# Patient Record
Sex: MALE | Race: WHITE
[De-identification: names, ages, dates, MRNs, and addresses within clinical notes are randomized per-mention and may not be internally consistent; named-entity substitution may affect disease eponyms.]

---

## 2018-01-10 ENCOUNTER — HOSPITAL ENCOUNTER (EMERGENCY)
Dept: HOSPITAL 25 - UCCORT | Age: 48
Discharge: HOME | End: 2018-01-10
Payer: COMMERCIAL

## 2018-01-10 VITALS — DIASTOLIC BLOOD PRESSURE: 92 MMHG | SYSTOLIC BLOOD PRESSURE: 139 MMHG
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
## 2018-01-10 DIAGNOSIS — I10: ICD-10-CM

## 2018-01-10 DIAGNOSIS — S61.217A: Primary | ICD-10-CM
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
## 2018-01-10 DIAGNOSIS — S60.052A: ICD-10-CM
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
## 2018-01-10 PROCEDURE — G0463 HOSPITAL OUTPT CLINIC VISIT: HCPCS

## 2018-01-10 PROCEDURE — 12001 RPR S/N/AX/GEN/TRNK 2.5CM/<: CPT

## 2018-01-10 PROCEDURE — 73140 X-RAY EXAM OF FINGER(S): CPT

## 2018-01-10 PROCEDURE — 99211 OFF/OP EST MAY X REQ PHY/QHP: CPT

## 2018-01-10 NOTE — RAD
INDICATION:  Crush injury, open wound left fifth finger.



TECHNIQUE: 3 views of the left fifth finger were obtained.



FINDINGS: There is soft tissue swelling present. The bones are in normal alignment. No

fracture is seen.  



IMPRESSION:  SOFT TISSUE SWELLING, NO FRACTURE IS SEEN.

## 2018-01-10 NOTE — UC
Laceration HPI





- HPI Summary


HPI Summary: 





pt c/o of laceration to left 5th finger on distal finger, "pad" o f finger.  Pt 

reports that his finger was crushed between a metal box and wall just prior ot 

arrival.  pt states he is UTD with tetanus. 





- History Of Current Complaint


Chief Complaint: UCTrauma


Stated Complaint: LEFT PINKY LACERATION WC


Time Seen by Provider: 01/10/18 12:22


Hx Obtained From: Patient


Laceration Location: Finger - left 5th


Mechanism Of Injury: Blunt Trauma


Onset/Duration: Sudden Onset


Severity: Mild


Aggravating Factors: Position, Movement





- Allergies/Home Medications


Allergies/Adverse Reactions: 


 Allergies











Allergy/AdvReac Type Severity Reaction Status Date / Time


 


hay fever Allergy  Wheezing Uncoded 01/10/18 12:22











Home Medications: 


 Home Medications





Rivaroxaban TAB(*) [Xarelto 20 mg]  01/10/18 [History]











PMH/Surg Hx/FS Hx/Imm Hx


Previously Healthy: Yes





- Surgical History


Surgical History: Yes


Surgery Procedure, Year, and Place: right shoulder surgery 2008.  right knee 

surgery 1985





- Family History


Known Family History: Positive: Hypertension





- Social History


Occupation: Employed Full-time


Lives: With Family


Alcohol Use: Occasionally


Substance Use Type: None


Smoking Status (MU): Never Smoked Tobacco


Have You Smoked in the Last Year: No





- Immunization History


Most Recent Influenza Vaccination: not this season


Most Recent Tetanus Shot: within 5 years





Review of Systems


Constitutional: Negative


Skin: Other - laceration left 5ht finger


Eyes: Negative


ENT: Negative


Respiratory: Negative


Cardiovascular: Negative


Gastrointestinal: Negative


Genitourinary: Negative


Motor: Negative


Neurovascular: Negative


Musculoskeletal: Arthralgia - left 5th finger distal,


Neurological: Negative


Psychological: Negative


Is Patient Immunocompromised?: No


All Other Systems Reviewed And Are Negative: Yes





Physical Exam


Triage Information Reviewed: Yes


Appearance: Well-Appearing


Vital Signs: 


 Initial Vital Signs











Temp  98.4 F   01/10/18 12:18


 


Pulse  78   01/10/18 12:18


 


Resp  18   01/10/18 12:18


 


BP  139/92   01/10/18 12:18


 


Pulse Ox  100   01/10/18 12:18











Vital Signs Reviewed: Yes


Eye Exam: Normal


ENT Exam: Normal


Dental Exam: Other - hallitosis


Respiratory: Positive: No respiratory distress


Musculoskeletal Exam: Normal


Musculoskeletal: Positive: Strength Intact, ROM Intact, No Edema


Neurological Exam: Normal


Psychological Exam: Normal


Skin Exam: Other - laceration to left distal palmar aspect aspect of 5th finger.





Laceration Repair





- Laceration Repair


  ** 1


Description: Linear


Laceration Size After Repair: Length (cm) - 2, Width (mm) - 2, Depth (mm) - 2


Modified For Repair: No


Cleansing Completed Via Routine Prep: Yes


Closure Material: Skin Adhesive


Closure Method: Single Layer


Suture Of: Skin





Laceration Course/Dx





- Differential Dx - Laceration/Wound


Differental Diagnoses: Laceration


Provider Diagnoses: laceration left 5th finger.  contusion left 5th finger





Discharge





- Discharge Plan


Condition: Stable


Disposition: HOME


Patient Education Materials:  Laceration (ED), Contusion in Adults (ED), Skin 

Adhesive Care (ED)


Referrals: 


Saundra Horowitz MD [Primary Care Provider] - If Needed